# Patient Record
Sex: MALE | Race: WHITE
[De-identification: names, ages, dates, MRNs, and addresses within clinical notes are randomized per-mention and may not be internally consistent; named-entity substitution may affect disease eponyms.]

---

## 2018-01-05 ENCOUNTER — HOSPITAL ENCOUNTER (EMERGENCY)
Dept: HOSPITAL 26 - MED | Age: 53
LOS: 1 days | Discharge: HOME | End: 2018-01-06
Payer: COMMERCIAL

## 2018-01-05 VITALS — HEIGHT: 71 IN | WEIGHT: 189 LBS | BODY MASS INDEX: 26.46 KG/M2

## 2018-01-05 VITALS — DIASTOLIC BLOOD PRESSURE: 68 MMHG | SYSTOLIC BLOOD PRESSURE: 149 MMHG
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
## 2018-01-05 DIAGNOSIS — S80.821A: Primary | ICD-10-CM

## 2018-01-05 DIAGNOSIS — E11.9: ICD-10-CM
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
## 2018-01-05 DIAGNOSIS — I10: ICD-10-CM
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
## 2018-01-06 VITALS — SYSTOLIC BLOOD PRESSURE: 149 MMHG | DIASTOLIC BLOOD PRESSURE: 68 MMHG

## 2018-01-06 NOTE — NUR
52/M c/o blister to right lower leg, water filled, denies injury or trauma. 
AOX4, ambulates with steady gait. Denies pain. No erythema, no swelling.

## 2018-01-06 NOTE — NUR
Patient discharged with v/s stable. Written and verbal after care instructions 
given and explained. 

Patient alert, oriented and verbalized understanding of instructions. 
Ambulatory with steady gait. All questions addressed prior to discharge. ID 
band removed. Patient advised to follow up with PMD. Rx of Bacitracin 500unit/g 
topical given. Patient educated on indication of medication including possible 
reaction and side effects. Opportunity to ask questions provided and answered.

## 2018-06-05 ENCOUNTER — HOSPITAL ENCOUNTER (EMERGENCY)
Dept: HOSPITAL 26 - MED | Age: 53
Discharge: HOME | End: 2018-06-05
Payer: COMMERCIAL

## 2018-06-05 VITALS — DIASTOLIC BLOOD PRESSURE: 67 MMHG | SYSTOLIC BLOOD PRESSURE: 153 MMHG

## 2018-06-05 VITALS — SYSTOLIC BLOOD PRESSURE: 153 MMHG | DIASTOLIC BLOOD PRESSURE: 67 MMHG

## 2018-06-05 VITALS — HEIGHT: 71 IN | BODY MASS INDEX: 24.5 KG/M2 | WEIGHT: 175 LBS

## 2018-06-05 DIAGNOSIS — L02.416: Primary | ICD-10-CM

## 2018-06-05 DIAGNOSIS — I12.0: ICD-10-CM

## 2018-06-05 DIAGNOSIS — N18.6: ICD-10-CM

## 2018-06-05 DIAGNOSIS — E11.22: ICD-10-CM

## 2018-06-05 DIAGNOSIS — Z99.2: ICD-10-CM

## 2018-06-05 NOTE — NUR
52 yo m bib self w/ c/o pain related to a "pimple" rupture that is bleeding x 2 
days causing sharp pain to left thigh. bleeding controlled at this time. pt a&o 
x 4. gcs 15. cms intact. rr even and unlabored. lungs bilaterally clear. er md yung notified. pt needs met. safety precautions in place. will continue to 
monitor.

## 2019-06-24 ENCOUNTER — HOSPITAL ENCOUNTER (EMERGENCY)
Dept: HOSPITAL 26 - MED | Age: 54
LOS: 1 days | Discharge: HOME | End: 2019-06-25
Payer: COMMERCIAL

## 2019-06-24 VITALS — WEIGHT: 180 LBS | HEIGHT: 71 IN | BODY MASS INDEX: 25.2 KG/M2

## 2019-06-24 DIAGNOSIS — Z99.2: ICD-10-CM

## 2019-06-24 DIAGNOSIS — L08.9: ICD-10-CM

## 2019-06-24 DIAGNOSIS — I10: ICD-10-CM

## 2019-06-24 DIAGNOSIS — M79.674: Primary | ICD-10-CM

## 2019-06-24 DIAGNOSIS — Z87.448: ICD-10-CM

## 2019-06-24 DIAGNOSIS — Z98.890: ICD-10-CM

## 2019-06-24 DIAGNOSIS — E11.9: ICD-10-CM

## 2019-06-25 VITALS — DIASTOLIC BLOOD PRESSURE: 74 MMHG | SYSTOLIC BLOOD PRESSURE: 157 MMHG

## 2019-06-25 NOTE — NUR
Patient discharged with v/s stable. Written and verbal after care instructions 
given and explained. 

Patient alert, oriented and verbalized understanding of instructions. 
Ambulatory with steady gait. All questions addressed prior to discharge. ID 
band removed. Patient advised to follow up with PMD. Rx of BACTRIM WAS given. 
Patient educated on indication of medication including possible reaction and 
side effects. Opportunity to ask questions provided and answered.



PT PAIN LEVEL WAS 0/10 PRIOR TO D/C. EMT BANDAGED THE FOOT AND PT WAS EDUCATED 
ON OUT PATIENT CARE FOR WOUND. PT UNDERSTOOD.

## 2020-02-15 ENCOUNTER — HOSPITAL ENCOUNTER (EMERGENCY)
Dept: HOSPITAL 26 - MED | Age: 55
LOS: 1 days | Discharge: HOME | End: 2020-02-16
Payer: COMMERCIAL

## 2020-02-15 VITALS — HEIGHT: 71 IN | BODY MASS INDEX: 26.46 KG/M2 | WEIGHT: 189 LBS

## 2020-02-15 VITALS — SYSTOLIC BLOOD PRESSURE: 125 MMHG | DIASTOLIC BLOOD PRESSURE: 63 MMHG

## 2020-02-15 DIAGNOSIS — I12.0: ICD-10-CM

## 2020-02-15 DIAGNOSIS — N18.6: ICD-10-CM

## 2020-02-15 DIAGNOSIS — Z99.2: ICD-10-CM

## 2020-02-15 DIAGNOSIS — E11.22: ICD-10-CM

## 2020-02-15 DIAGNOSIS — J20.9: Primary | ICD-10-CM

## 2020-02-16 VITALS — DIASTOLIC BLOOD PRESSURE: 63 MMHG | SYSTOLIC BLOOD PRESSURE: 125 MMHG

## 2020-02-16 NOTE — NUR
Attending Physician Statement GE  I have discussed the care of Heriberto Barclay, including pertinent history and exam findings with the resident. I have reviewed the key elements of all parts of the encounter with the resident.      Htn- well controlled on Coreg  DM- A1C 14 08/18  On Basaglar 54 - increase to 65 units and Novolog 15 Units with meals  On Metformin, Glipizide  Refused  Diabetes education  Check lipids, microalbumin    Smoker- nicotine patch    Refused vaccines    Follow up in 4 weeks    Monty Morocho MD PT AMBULATED TO BED 1

## 2020-02-16 NOTE — NUR
55 YEAR OLD MALE STATES THAT HE HAS HAD A COUGH FOR THE PAST YEAR. LUNGS CTABL. 
PATIENT STATES HE HAS DISCOMFORT IN HIS STOMACH AND HAS A HEADACHE. PATIENT 
STATES HE HAS PAIN DURING COUGH. PATIENT AOX4, BREATHING EVEN AND UNLABORED, 
SKIN WARM AND DRY. BED IN LOWEST POSITION, LOCKED, BED RAIL UPX1.



PMH - DM2, HTN, DIALYSIS

ALLERGIES - NKA

## 2020-02-16 NOTE — NUR
DISCHARGE DONE BY DR ALVARADO. Patient discharged with v/s stable. Written and 
verbal after care instructions about bronchitis given and explained. Patient 
alert, oriented and verbalized understanding of instructions. Ambulatory with 
steady gait. All questions addressed prior to discharge. ID band removed. 
Patient advised to follow up with PMD. Rx of tessalon perles, levaquin given. 
Patient educated on indication of medication including possible reaction and 
side effects. Opportunity to ask questions provided and answered.

## 2020-02-19 ENCOUNTER — HOSPITAL ENCOUNTER (EMERGENCY)
Dept: HOSPITAL 26 - MED | Age: 55
Discharge: HOME | End: 2020-02-19
Payer: COMMERCIAL

## 2020-02-19 VITALS — BODY MASS INDEX: 26.04 KG/M2 | HEIGHT: 71 IN | WEIGHT: 186 LBS

## 2020-02-19 VITALS — DIASTOLIC BLOOD PRESSURE: 62 MMHG | SYSTOLIC BLOOD PRESSURE: 123 MMHG

## 2020-02-19 VITALS — DIASTOLIC BLOOD PRESSURE: 61 MMHG | SYSTOLIC BLOOD PRESSURE: 123 MMHG

## 2020-02-19 DIAGNOSIS — L02.31: Primary | ICD-10-CM

## 2020-02-19 DIAGNOSIS — I10: ICD-10-CM

## 2020-02-19 DIAGNOSIS — K40.90: ICD-10-CM

## 2020-02-19 DIAGNOSIS — E11.9: ICD-10-CM

## 2020-02-19 NOTE — NUR
55/M BIB SELF C/O L GROIN pain X 3 HOURS PTA. Pt reports pain was hurting while 
driving. Current pain level 5/10. Pt had dialysis today. Med hx: HTN, DM, 
Kidney Failure, Dialysis MWF, [facility] .

-------------------------------------------------------------------------------

Addendum: 02/19/20 at 1512 by [email or web address]

-------------------------------------------------------------------------------

ANURIA X 5 YEARS.

## 2020-03-04 ENCOUNTER — HOSPITAL ENCOUNTER (EMERGENCY)
Dept: HOSPITAL 26 - MED | Age: 55
Discharge: HOME | End: 2020-03-04
Payer: COMMERCIAL

## 2020-03-04 VITALS — SYSTOLIC BLOOD PRESSURE: 145 MMHG | DIASTOLIC BLOOD PRESSURE: 73 MMHG

## 2020-03-04 VITALS — HEIGHT: 71 IN | WEIGHT: 180 LBS | BODY MASS INDEX: 25.2 KG/M2

## 2020-03-04 VITALS — DIASTOLIC BLOOD PRESSURE: 73 MMHG | SYSTOLIC BLOOD PRESSURE: 145 MMHG

## 2020-03-04 DIAGNOSIS — E11.22: ICD-10-CM

## 2020-03-04 DIAGNOSIS — R19.7: ICD-10-CM

## 2020-03-04 DIAGNOSIS — I10: ICD-10-CM

## 2020-03-04 DIAGNOSIS — E11.40: Primary | ICD-10-CM

## 2020-03-04 DIAGNOSIS — Z99.2: ICD-10-CM

## 2020-03-04 LAB
ALBUMIN FLD-MCNC: 3.8 G/DL (ref 3.4–5)
ANION GAP SERPL CALCULATED.3IONS-SCNC: 14.8 MMOL/L (ref 8–16)
AST SERPL-CCNC: 55 U/L (ref 15–37)
BASOPHILS # BLD AUTO: 0 K/UL (ref 0–0.22)
BASOPHILS NFR BLD AUTO: 0.6 % (ref 0–2)
BILIRUB SERPL-MCNC: 0.3 MG/DL (ref 0–1)
BUN SERPL-MCNC: 65 MG/DL (ref 7–18)
CHLORIDE SERPL-SCNC: 92 MMOL/L (ref 98–107)
CO2 SERPL-SCNC: 28 MMOL/L (ref 21–32)
CREAT SERPL-MCNC: 11.5 MG/DL (ref 0.6–1.3)
EOSINOPHIL # BLD AUTO: 0 K/UL (ref 0–0.4)
EOSINOPHIL NFR BLD AUTO: 1 % (ref 0–4)
ERYTHROCYTE [DISTWIDTH] IN BLOOD BY AUTOMATED COUNT: 13 % (ref 11.6–13.7)
GFR SERPL CREATININE-BSD FRML MDRD: 6 ML/MIN (ref 90–?)
GLUCOSE SERPL-MCNC: 96 MG/DL (ref 74–106)
HCT VFR BLD AUTO: 28.7 % (ref 36–52)
HGB BLD-MCNC: 9.9 G/DL (ref 12–18)
LYMPHOCYTES # BLD AUTO: 0.7 K/UL (ref 2–11.5)
LYMPHOCYTES NFR BLD AUTO: 21.3 % (ref 20.5–51.1)
MCH RBC QN AUTO: 33 PG (ref 27–31)
MCHC RBC AUTO-ENTMCNC: 35 G/DL (ref 33–37)
MCV RBC AUTO: 95.7 FL (ref 80–94)
MONOCYTES # BLD AUTO: 0.4 K/UL (ref 0.8–1)
MONOCYTES NFR BLD AUTO: 14 % (ref 1.7–9.3)
NEUTROPHILS # BLD AUTO: 2 K/UL (ref 1.8–7.7)
NEUTROPHILS NFR BLD AUTO: 63.1 % (ref 42.2–75.2)
PLATELET # BLD AUTO: 193 K/UL (ref 140–450)
POTASSIUM SERPL-SCNC: 3.8 MMOL/L (ref 3.5–5.1)
RBC # BLD AUTO: 2.99 MIL/UL (ref 4.2–6.1)
SODIUM SERPL-SCNC: 131 MMOL/L (ref 136–145)
WBC # BLD AUTO: 3.1 K/UL (ref 4.8–10.8)

## 2020-03-04 PROCEDURE — 71045 X-RAY EXAM CHEST 1 VIEW: CPT

## 2020-03-04 PROCEDURE — 99284 EMERGENCY DEPT VISIT MOD MDM: CPT

## 2020-03-04 PROCEDURE — 82948 REAGENT STRIP/BLOOD GLUCOSE: CPT

## 2020-03-04 PROCEDURE — 36415 COLL VENOUS BLD VENIPUNCTURE: CPT

## 2020-03-04 PROCEDURE — 85025 COMPLETE CBC W/AUTO DIFF WBC: CPT

## 2020-03-04 PROCEDURE — 83880 ASSAY OF NATRIURETIC PEPTIDE: CPT

## 2020-03-04 PROCEDURE — 96374 THER/PROPH/DIAG INJ IV PUSH: CPT

## 2020-03-04 PROCEDURE — 80053 COMPREHEN METABOLIC PANEL: CPT

## 2020-03-04 NOTE — NUR
56 y/o c/o bilat leg pain x yesterday at 2000. rates pain 8/10 and describes it 
as shooting pain that starts from being the knee to the foot. no obvious 
deformity noted. vss. denies any truama or injury to the leg. steady gait. a&o 
x4. pt states sensation on both legs are minimal when palpating. pedal pulses 
+2 bilat.



nka.



pmh: diaylsis av shunt located on left upper forearm, htn, dm.

## 2020-03-04 NOTE — NUR
-------------------------------------------------------------------------------

            *** Note dellone in EDM - 03/04/20 at 0832 by [email or web address] ***            

-------------------------------------------------------------------------------

Patient discharged with v/s stable. Written and verbal after care instructions 
given and explained. 

Patient alert, oriented and verbalized understanding of instructions. 
Ambulatory with steady gait. All questions addressed prior to discharge. ID 
band removed. Patient advised to follow up with PMD. Rx of NORCO & IBUPROFEN 
given. Patient educated on indication of medication including possible reaction 
and side effects. Opportunity to ask questions provided and answered.

## 2020-03-04 NOTE — NUR
Patient discharged with v/s stable. Written and verbal after care instructions 
given and explained. 

Patient alert, oriented and verbalized understanding of instructions. 
Ambulatory with steady gait. All questions addressed prior to discharge. ID 
band removed. Patient advised to follow up with PMD. Rx of NORCO & IBUPROFEN 
given. Patient educated on indication of medication including possible reaction 
and side effects. Opportunity to ask questions provided and answered.

## 2020-03-05 ENCOUNTER — HOSPITAL ENCOUNTER (EMERGENCY)
Dept: HOSPITAL 26 - MED | Age: 55
Discharge: HOME | End: 2020-03-05
Payer: COMMERCIAL

## 2020-03-05 VITALS — DIASTOLIC BLOOD PRESSURE: 75 MMHG | SYSTOLIC BLOOD PRESSURE: 165 MMHG

## 2020-03-05 VITALS — SYSTOLIC BLOOD PRESSURE: 147 MMHG | DIASTOLIC BLOOD PRESSURE: 66 MMHG

## 2020-03-05 VITALS — WEIGHT: 180 LBS | BODY MASS INDEX: 25.2 KG/M2 | HEIGHT: 71 IN

## 2020-03-05 DIAGNOSIS — N28.9: ICD-10-CM

## 2020-03-05 DIAGNOSIS — E11.40: Primary | ICD-10-CM

## 2020-03-05 DIAGNOSIS — R19.7: ICD-10-CM

## 2020-03-05 DIAGNOSIS — R11.2: ICD-10-CM

## 2020-03-05 DIAGNOSIS — I10: ICD-10-CM

## 2020-03-05 PROCEDURE — 96372 THER/PROPH/DIAG INJ SC/IM: CPT

## 2020-03-05 PROCEDURE — 99283 EMERGENCY DEPT VISIT LOW MDM: CPT

## 2020-03-05 NOTE — NUR
PAIN ON BLE X 6 DAYS, 7/10

PT CAME TO ER LAST MONDAY D/T PAIN IN BLE. DX: NEUROPATHY

PRESCRIBED WITH MOTRIN, NORCO. NO RELIEF.

+COUGH, +RUNNY NOSE, PT AWAKE ,ALERT WITH MILD FEVER AT 99.6 PER TEMPORAL SCAN, 
AMBULATORY WITH STEADY GAIT , SCE ,CBS ,NO LIMITATION ROM LE.



PMH: DM, HTN, SINUS, ESRD

MEDS: SEE LIST

NKA

## 2020-03-05 NOTE — NUR
Patient discharged with v/s stable. Written and verbal after care instructions 
given and explained. 

Patient alert, oriented and verbalized understanding of instructions. 
Ambulatory with steady gait. All questions addressed prior to discharge. ID 
band removed. Patient advised to follow up with PMD. Rx of 
IMODIUM,CIPRO,ZOFRAN,GABAPENTIN given. Patient educated on indication of 
medication including possible reaction and side effects. Opportunity to ask 
questions provided and answered.